# Patient Record
Sex: FEMALE | Employment: OTHER | URBAN - NONMETROPOLITAN AREA
[De-identification: names, ages, dates, MRNs, and addresses within clinical notes are randomized per-mention and may not be internally consistent; named-entity substitution may affect disease eponyms.]

---

## 2022-01-01 ENCOUNTER — APPOINTMENT (OUTPATIENT)
Dept: URGENT CARE | Facility: PHYSICIAN GROUP | Age: 87
End: 2022-01-01

## 2022-01-01 ENCOUNTER — OFFICE VISIT (OUTPATIENT)
Dept: URGENT CARE | Facility: PHYSICIAN GROUP | Age: 87
End: 2022-01-01
Payer: MEDICARE

## 2022-01-01 VITALS
SYSTOLIC BLOOD PRESSURE: 120 MMHG | TEMPERATURE: 97.8 F | HEART RATE: 61 BPM | OXYGEN SATURATION: 96 % | BODY MASS INDEX: 20.8 KG/M2 | RESPIRATION RATE: 16 BRPM | DIASTOLIC BLOOD PRESSURE: 70 MMHG | WEIGHT: 113 LBS | HEIGHT: 62 IN

## 2022-01-01 DIAGNOSIS — S81.812D SKIN TEAR OF LEFT LOWER LEG WITHOUT COMPLICATION, SUBSEQUENT ENCOUNTER: ICD-10-CM

## 2022-01-01 DIAGNOSIS — Z48.02 REMOVAL OF STAPLES: ICD-10-CM

## 2022-01-01 PROCEDURE — 99202 OFFICE O/P NEW SF 15 MIN: CPT | Performed by: STUDENT IN AN ORGANIZED HEALTH CARE EDUCATION/TRAINING PROGRAM

## 2022-11-12 NOTE — PROGRESS NOTES
"Subjective:   Izabela Johnson is a 89 y.o. female who presents for Suture / Staple Removal      HPI:  Pleasant 89-year-old female presents the clinic with her daughter for staple removal on her head.  She fell 10 days ago and sustained a laceration to the top of her head and a skin tear to her left lower leg just above her ankle.  She had 2 staples placed at that time.  She is here to get her staples removed.  Denies fever, chills, numbness, tingling, burning, reduced range of motion, purulent drainage from either the laceration repair or skin tear, increasing redness surrounding the wounds, swelling, chest pain, palpitations, lower leg swelling, lower leg pain, headache, head pain, nausea, vomiting, abdominal pain, diarrhea, or dizziness.  Patient does live at an assisted living facility.    Medications:    This patient does not have an active medication from one of the medication groupers.    Allergies: Sulfa drugs    Problem List: Izabela Johnson does not have a problem list on file.    Surgical History:  No past surgical history on file.    Past Social Hx: Izabela Johnson  reports that she has never smoked. She has never used smokeless tobacco.     Past Family Hx:  Izabela Johnson family history is not on file.     Problem list, medications, and allergies reviewed by myself today in Epic.     Objective:     /70   Pulse 61   Temp 36.6 °C (97.8 °F) (Temporal)   Resp 16   Ht 1.575 m (5' 2\")   Wt 51.3 kg (113 lb)   SpO2 96%   BMI 20.67 kg/m²     Physical Exam  Vitals reviewed.   Constitutional:       General: She is not in acute distress.     Appearance: Normal appearance.   HENT:      Head: Normocephalic.        Comments: Half inch linear laceration with overlying scab present to the posterior right scalp.  Laceration is well approximated and there are 2 staples in place.  No surrounding erythema, induration, fluctuance, or drainage.  2 staples were removed without complication.  Wound stayed approximated and scab " is still overlying.     Nose: Nose normal.      Mouth/Throat:      Mouth: Mucous membranes are moist.   Eyes:      Conjunctiva/sclera: Conjunctivae normal.      Pupils: Pupils are equal, round, and reactive to light.   Cardiovascular:      Rate and Rhythm: Normal rate and regular rhythm.      Pulses: Normal pulses.      Heart sounds: Normal heart sounds. No murmur heard.  Pulmonary:      Effort: Pulmonary effort is normal. No respiratory distress.      Breath sounds: Normal breath sounds. No stridor. No wheezing, rhonchi or rales.   Skin:     General: Skin is warm and dry.      Capillary Refill: Capillary refill takes less than 2 seconds.             Comments: 1 inch x 1 inch skin tear present to the anterior distal left lower leg.  Scab is present over top.  Granulation tissue also present.  No surrounding erythema, induration, fluctuance, or purulent drainage.  Wound appears to be healing well.   Neurological:      General: No focal deficit present.      Mental Status: She is alert and oriented to person, place, and time.       Assessment/Plan:     Diagnosis and associated orders:     1. Removal of staples        2. Skin tear of left lower leg without complication, subsequent encounter           Comments/MDM:     Patient presents for staple removal after sustaining a fall and subsequent laceration 10 days ago.  2 staples were removed without complication.  No dehiscence of the wound.  She does also have a skin tear to her left anterior lower leg with no signs of infection or surrounding cellulitis.  Skin tear was redressed and the patient and patient's daughter were educated on wound care.  No signs of infection surrounding the staple site.  Vitals all within normal limits and she is afebrile.  She is well-appearing at this time.  No signs of complication.  ED/return precautions were given.  She was advised as well as her daughter on the signs of infection to keep an eye out for.  If any signs of infection do  occur she should be brought into the urgent care immediately for reevaluation.  Signs of infection including increasing redness, purulent drainage, swelling, fever, nausea, vomiting, or increasing pain.         Differential diagnosis, natural history, supportive care, and indications for immediate follow-up discussed.    Advised the patient to follow-up with the primary care physician for recheck, reevaluation, and consideration of further management.    Please note that this dictation was created using voice recognition software. I have made a reasonable attempt to correct obvious errors, but I expect that there are errors of grammar and possibly content that I did not discover before finalizing the note.    Electronically signed by Octavio Deutsch PA-C.